# Patient Record
Sex: MALE
[De-identification: names, ages, dates, MRNs, and addresses within clinical notes are randomized per-mention and may not be internally consistent; named-entity substitution may affect disease eponyms.]

---

## 2017-05-17 ENCOUNTER — NON-APPOINTMENT (OUTPATIENT)
Age: 37
End: 2017-05-17

## 2017-05-17 ENCOUNTER — APPOINTMENT (OUTPATIENT)
Dept: INTERNAL MEDICINE | Facility: CLINIC | Age: 37
End: 2017-05-17

## 2017-05-17 VITALS — WEIGHT: 236 LBS | BODY MASS INDEX: 33.04 KG/M2 | HEIGHT: 71 IN

## 2017-05-17 DIAGNOSIS — Z82.61 FAMILY HISTORY OF ARTHRITIS: ICD-10-CM

## 2017-05-17 DIAGNOSIS — M25.569 PAIN IN UNSPECIFIED KNEE: ICD-10-CM

## 2017-05-17 DIAGNOSIS — M25.521 PAIN IN RIGHT ELBOW: ICD-10-CM

## 2017-05-17 DIAGNOSIS — Z00.00 ENCOUNTER FOR GENERAL ADULT MEDICAL EXAMINATION W/OUT ABNORMAL FINDINGS: ICD-10-CM

## 2017-05-17 DIAGNOSIS — Z82.5 FAMILY HISTORY OF ASTHMA AND OTHER CHRONIC LOWER RESPIRATORY DISEASES: ICD-10-CM

## 2017-05-17 DIAGNOSIS — M77.11 LATERAL EPICONDYLITIS, RIGHT ELBOW: ICD-10-CM

## 2017-05-17 DIAGNOSIS — M22.41 CHONDROMALACIA PATELLAE, RIGHT KNEE: ICD-10-CM

## 2017-05-17 DIAGNOSIS — R73.01 IMPAIRED FASTING GLUCOSE: ICD-10-CM

## 2017-05-17 RX ORDER — MULTIVITAMIN
TABLET ORAL
Refills: 0 | Status: ACTIVE | COMMUNITY
Start: 2017-05-17

## 2017-05-30 ENCOUNTER — APPOINTMENT (OUTPATIENT)
Dept: SURGERY | Facility: CLINIC | Age: 37
End: 2017-05-30

## 2017-05-30 VITALS
TEMPERATURE: 98.2 F | RESPIRATION RATE: 18 BRPM | HEART RATE: 61 BPM | OXYGEN SATURATION: 97 % | DIASTOLIC BLOOD PRESSURE: 81 MMHG | SYSTOLIC BLOOD PRESSURE: 125 MMHG

## 2017-06-09 ENCOUNTER — OUTPATIENT (OUTPATIENT)
Dept: OUTPATIENT SERVICES | Facility: HOSPITAL | Age: 37
LOS: 1 days | End: 2017-06-09

## 2017-06-09 VITALS
HEIGHT: 71.5 IN | RESPIRATION RATE: 16 BRPM | WEIGHT: 235.89 LBS | HEART RATE: 84 BPM | DIASTOLIC BLOOD PRESSURE: 72 MMHG | TEMPERATURE: 97 F | SYSTOLIC BLOOD PRESSURE: 130 MMHG

## 2017-06-09 DIAGNOSIS — D17.9 BENIGN LIPOMATOUS NEOPLASM, UNSPECIFIED: ICD-10-CM

## 2017-06-09 DIAGNOSIS — G47.33 OBSTRUCTIVE SLEEP APNEA (ADULT) (PEDIATRIC): ICD-10-CM

## 2017-06-09 DIAGNOSIS — D17.9 BENIGN LIPOMATOUS NEOPLASM, UNSPECIFIED: Chronic | ICD-10-CM

## 2017-06-09 DIAGNOSIS — D17.30 BENIGN LIPOMATOUS NEOPLASM OF SKIN AND SUBCUTANEOUS TISSUE OF UNSPECIFIED SITES: ICD-10-CM

## 2017-06-09 LAB
HCT VFR BLD CALC: 42.6 % — SIGNIFICANT CHANGE UP (ref 39–50)
HGB BLD-MCNC: 14.1 G/DL — SIGNIFICANT CHANGE UP (ref 13–17)
MCHC RBC-ENTMCNC: 29.8 PG — SIGNIFICANT CHANGE UP (ref 27–34)
MCHC RBC-ENTMCNC: 33.1 % — SIGNIFICANT CHANGE UP (ref 32–36)
MCV RBC AUTO: 90.1 FL — SIGNIFICANT CHANGE UP (ref 80–100)
PLATELET # BLD AUTO: 237 K/UL — SIGNIFICANT CHANGE UP (ref 150–400)
PMV BLD: 9.2 FL — SIGNIFICANT CHANGE UP (ref 7–13)
RBC # BLD: 4.73 M/UL — SIGNIFICANT CHANGE UP (ref 4.2–5.8)
RBC # FLD: 12.6 % — SIGNIFICANT CHANGE UP (ref 10.3–14.5)
WBC # BLD: 6.14 K/UL — SIGNIFICANT CHANGE UP (ref 3.8–10.5)
WBC # FLD AUTO: 6.14 K/UL — SIGNIFICANT CHANGE UP (ref 3.8–10.5)

## 2017-06-09 NOTE — H&P PST ADULT - NSANTHOSAYNRD_GEN_A_CORE
No. MARIANO screening performed.  STOP BANG Legend: 0-2 = LOW Risk; 3-4 = INTERMEDIATE Risk; 5-8 = HIGH Risk

## 2017-06-09 NOTE — H&P PST ADULT - RS GEN PE MLT RESP DETAILS PC
good air movement/no rales/no wheezes/breath sounds equal/respirations non-labored/no rhonchi/clear to auscultation bilaterally

## 2017-06-09 NOTE — H&P PST ADULT - MUSCULOSKELETAL
details… detailed exam ROM intact/no joint erythema/no joint swelling/no calf tenderness/no joint warmth

## 2017-06-09 NOTE — H&P PST ADULT - TEACHING/LEARNING LEARNING PREFERENCES
verbal instruction/video/written material/skill demonstration/pictorial/computer/internet/group instruction/audio/individual instruction

## 2017-06-09 NOTE — H&P PST ADULT - HISTORY OF PRESENT ILLNESS
This is a 36 y.o. male who presented to Dr Ulloa complaining of benign lipomas of skin. Pt now for surgery .

## 2017-06-09 NOTE — H&P PST ADULT - PROBLEM SELECTOR PLAN 1
benign lipomatous neoplasm of skin and subcutaneous tissue of unspecified sites;  excision of lipomas .chest and anterior abdomen and left flank , right and left arm anterior medial thigh

## 2017-06-16 ENCOUNTER — RESULT REVIEW (OUTPATIENT)
Age: 37
End: 2017-06-16

## 2017-06-16 ENCOUNTER — OUTPATIENT (OUTPATIENT)
Dept: OUTPATIENT SERVICES | Facility: HOSPITAL | Age: 37
LOS: 1 days | Discharge: HOME | End: 2017-06-16
Payer: MEDICAID

## 2017-06-16 VITALS
SYSTOLIC BLOOD PRESSURE: 126 MMHG | HEART RATE: 53 BPM | DIASTOLIC BLOOD PRESSURE: 78 MMHG | TEMPERATURE: 98 F | OXYGEN SATURATION: 98 % | WEIGHT: 235.89 LBS | RESPIRATION RATE: 20 BRPM | HEIGHT: 71.5 IN

## 2017-06-16 VITALS
HEART RATE: 75 BPM | TEMPERATURE: 98 F | DIASTOLIC BLOOD PRESSURE: 81 MMHG | RESPIRATION RATE: 15 BRPM | SYSTOLIC BLOOD PRESSURE: 135 MMHG | OXYGEN SATURATION: 95 %

## 2017-06-16 DIAGNOSIS — D17.30 BENIGN LIPOMATOUS NEOPLASM OF SKIN AND SUBCUTANEOUS TISSUE OF UNSPECIFIED SITES: ICD-10-CM

## 2017-06-16 DIAGNOSIS — D17.9 BENIGN LIPOMATOUS NEOPLASM, UNSPECIFIED: Chronic | ICD-10-CM

## 2017-06-16 PROCEDURE — 88304 TISSUE EXAM BY PATHOLOGIST: CPT | Mod: 26

## 2017-06-16 PROCEDURE — 21930 EXC BACK LES SC < 3 CM: CPT

## 2017-06-16 PROCEDURE — 21555 EXC NECK LES SC < 3 CM: CPT

## 2017-06-16 PROCEDURE — 22902 EXC ABD LES SC < 3 CM: CPT

## 2017-06-16 PROCEDURE — 24071 EXC ARM/ELBOW LES SC 3 CM/>: CPT

## 2017-06-16 NOTE — BRIEF OPERATIVE NOTE - POST-OP DX
Benign lipomatous neoplasm of skin and subcutaneous tissue of other and unspecified sites  06/16/2017    Active  Myles Zendejas

## 2017-06-16 NOTE — ASU DISCHARGE PLAN (ADULT/PEDIATRIC). - ITEMS TO FOLLOWUP WITH YOUR PHYSICIAN'S
as above  Take narcotic pain medication with food to avoid nausea and vomiting that can be a side effect of the narcotic pain medication  Avoid constipation which may also be a side effect of the narcotic pain medication; increase fluids and fiber in your diet

## 2017-06-16 NOTE — ASU DISCHARGE PLAN (ADULT/PEDIATRIC). - NOTIFY
Pain not relieved by Medications/Persistent Nausea and Vomiting/Swelling that continues/Fever greater than 101/Bleeding that does not stop

## 2017-06-16 NOTE — ASU DISCHARGE PLAN (ADULT/PEDIATRIC). - MEDICATION SUMMARY - MEDICATIONS TO TAKE
I will START or STAY ON the medications listed below when I get home from the hospital:    oxycodone-acetaminophen 5mg-325mg oral tablet  -- 1 tab(s) by mouth every 8 hours, As Needed -for moderate pain -for severe pain MDD:4  -- Caution federal law prohibits the transfer of this drug to any person other  than the person for whom it was prescribed.  May cause drowsiness.  Alcohol may intensify this effect.  Use care when operating dangerous machinery.  This prescription cannot be refilled.  This product contains acetaminophen.  Do not use  with any other product containing acetaminophen to prevent possible liver damage.  Using more of this medication than prescribed may cause serious breathing problems.    -- Indication: For moderate to severe pain

## 2017-06-16 NOTE — ASU DISCHARGE PLAN (ADULT/PEDIATRIC). - INSTRUCTIONS
PROGRESS SLOWLY, INCREASE FLUIDS Bananas, rice, applesauce, toast diet is good for a light diet at home for today. No fried, spicy or greasy foods. Increase fluids as tolerated  If your doctor prescribed narcotic pain medications after your procedure to help prevent and reduce constipation, increase fluid intake and fiber intake in your diet. You may take OTC Stool Softeners such as Colace to help reduce constipation.    INCREASE FLUIDS

## 2017-06-16 NOTE — ASU DISCHARGE PLAN (ADULT/PEDIATRIC). - FOLLOWUP APPOINTMENT CLINIC/PHYSICIAN
Please follow up with Dr. Ulloa 2 weeks after your operation. You may call [phone] to schedule an appointment.

## 2017-06-16 NOTE — BRIEF OPERATIVE NOTE - OPERATION/FINDINGS
EXCISION OF LIPOMAS, CHEST AND MIDLINE Cho  EXCISION OF LIPOMAS- ANTERIOR ABDOMEN LEFT & RIGHT & LEFT  FLANK, RIGHT AND LEFT ARM ANTERIOR MEDIAL  THIGH LEFT & RIGHT

## 2017-06-16 NOTE — ASU DISCHARGE PLAN (ADULT/PEDIATRIC). - NURSING INSTRUCTIONS
Sponge bath for 2-3 days, then may shower afterwards. Pat incision area dry, do not rub. Steri- strips will fall off on their own.

## 2017-06-17 ENCOUNTER — TRANSCRIPTION ENCOUNTER (OUTPATIENT)
Age: 37
End: 2017-06-17

## 2017-06-22 LAB — SURGICAL PATHOLOGY STUDY: SIGNIFICANT CHANGE UP

## 2017-08-08 ENCOUNTER — APPOINTMENT (OUTPATIENT)
Dept: SURGERY | Facility: CLINIC | Age: 37
End: 2017-08-08

## 2020-06-30 PROBLEM — F41.9 ANXIETY DISORDER, UNSPECIFIED: Chronic | Status: ACTIVE | Noted: 2017-06-09

## 2020-07-06 ENCOUNTER — APPOINTMENT (OUTPATIENT)
Dept: SURGERY | Facility: CLINIC | Age: 40
End: 2020-07-06

## 2021-04-15 ENCOUNTER — OUTPATIENT (OUTPATIENT)
Dept: OUTPATIENT SERVICES | Facility: HOSPITAL | Age: 41
LOS: 1 days | End: 2021-04-15
Payer: COMMERCIAL

## 2021-04-15 ENCOUNTER — APPOINTMENT (OUTPATIENT)
Dept: VASCULAR SURGERY | Facility: CLINIC | Age: 41
End: 2021-04-15
Payer: MEDICAID

## 2021-04-15 ENCOUNTER — RESULT REVIEW (OUTPATIENT)
Age: 41
End: 2021-04-15

## 2021-04-15 DIAGNOSIS — D17.9 BENIGN LIPOMATOUS NEOPLASM, UNSPECIFIED: Chronic | ICD-10-CM

## 2021-04-15 DIAGNOSIS — S60.559A SUPERFICIAL FOREIGN BODY OF UNSPECIFIED HAND, INITIAL ENCOUNTER: ICD-10-CM

## 2021-04-15 LAB
GRAM STN FLD: SIGNIFICANT CHANGE UP
SPECIMEN SOURCE: SIGNIFICANT CHANGE UP

## 2021-04-15 PROCEDURE — 87070 CULTURE OTHR SPECIMN AEROBIC: CPT

## 2021-04-15 PROCEDURE — 88304 TISSUE EXAM BY PATHOLOGIST: CPT | Mod: 26

## 2021-04-15 PROCEDURE — 99072 ADDL SUPL MATRL&STAF TM PHE: CPT

## 2021-04-15 PROCEDURE — 87075 CULTR BACTERIA EXCEPT BLOOD: CPT

## 2021-04-15 PROCEDURE — 35860 EXPLORE LIMB VESSELS: CPT

## 2021-04-15 PROCEDURE — 99203 OFFICE O/P NEW LOW 30 MIN: CPT | Mod: 57

## 2021-04-15 RX ORDER — CEPHALEXIN 250 MG/1
250 TABLET ORAL EVERY 8 HOURS
Qty: 21 | Refills: 0 | Status: ACTIVE | COMMUNITY
Start: 2021-04-15 | End: 1900-01-01

## 2021-04-15 NOTE — PROCEDURE
[FreeTextEntry1] : I had a discussion with patient about the options of continued surveillance vs. exploration. The patient is reluctant to undergo observation and we agreed given the clinical findings, and the concern on outpatient imaging, to explore the dorsum of the hand. We discussed doing this in the hospital vs. office, and given the concerns for Covid testing and delays in the procedure, the patient opted to undergo treatment in the office.\par \par Informed consent was obtained. Risks included pain, bleeding, infection, nerve/ muscle/ tendon injury.\par \par The dorsum of the hand was prepped with betadine.\par \par We infiltrated approximately 3cc's of 1% lidocain (no epi).\par \par A linear incision was made of the area of prominence.\par I dissected through the subcutanoues tissue using a #15 blade scalpel.\par I identified the vein that appeared to be causing the prominenence.\par A venotomy was made, and inside the vein was a 2 inch cord of thrombotic material. On inspection inside the vein there was no foreign body.\par I closed the venotomy with a 5-0 prolene suture.\par I then explored the superficial area of the dorsum of the hand using gentle blunt dissection and could not identify any foreign body.\par The incision was irrigated.\par It was then closed with four interrupted 3-0 nylon sutures\par Sterile dressings were applied.

## 2021-04-15 NOTE — ASSESSMENT
[FreeTextEntry1] : 41 y/o M with episode of phlebitis following IV. An exploration was performed given concern for foreign body based on outside imaging, and clinical picture of painful cord-like swelling.\par \par Plan:\par 1) Patient given wound care advice.\par 2) Will see in 1 week to remove sutures.\par 3) Advised to call if pain or swelling, erythema develops.\par 4) Given 1 week of Keflex.

## 2021-04-15 NOTE — PHYSICAL EXAM
[2+] : left 2+ [Alert] : alert [Oriented to Person] : oriented to person [Oriented to Place] : oriented to place [Oriented to Time] : oriented to time [Calm] : calm [de-identified] : Ambulatory [de-identified] : Dorsum of right hand, there is a palpable cord, mildly tender, in one of the dorsal hand veins. This begins at the puncture site of the IV, and tracks up the back of the hand. It is non-compressible and mobile. No erythema or pus. No discharge.

## 2021-04-20 ENCOUNTER — OUTPATIENT (OUTPATIENT)
Dept: OUTPATIENT SERVICES | Facility: HOSPITAL | Age: 41
LOS: 1 days | End: 2021-04-20
Payer: COMMERCIAL

## 2021-04-20 DIAGNOSIS — S60.559A SUPERFICIAL FOREIGN BODY OF UNSPECIFIED HAND, INITIAL ENCOUNTER: ICD-10-CM

## 2021-04-20 DIAGNOSIS — D17.9 BENIGN LIPOMATOUS NEOPLASM, UNSPECIFIED: Chronic | ICD-10-CM

## 2021-04-20 LAB
CULTURE RESULTS: NO GROWTH — SIGNIFICANT CHANGE UP
SPECIMEN SOURCE: SIGNIFICANT CHANGE UP

## 2021-04-21 LAB — SURGICAL PATHOLOGY STUDY: SIGNIFICANT CHANGE UP

## 2021-04-22 ENCOUNTER — NON-APPOINTMENT (OUTPATIENT)
Age: 41
End: 2021-04-22

## 2021-04-22 ENCOUNTER — APPOINTMENT (OUTPATIENT)
Dept: VASCULAR SURGERY | Facility: CLINIC | Age: 41
End: 2021-04-22

## 2021-04-27 PROCEDURE — 88304 TISSUE EXAM BY PATHOLOGIST: CPT

## 2021-04-27 PROCEDURE — 97530 THERAPEUTIC ACTIVITIES: CPT

## 2023-09-11 NOTE — HISTORY OF PRESENT ILLNESS
No [FreeTextEntry1] : 41 y/o M presents to our office with complaints of "an IV stuck in his right hand'. The patient underwent a vasectomy approximately 2 weeks ago. SInce that time, has had pain on the dorsum of his right hand where the IV was placed. Initially the hand felt okay, however after a few days it became swollen and more painful when moving it. He is now left with the feeling of something being stuck in his hand. He has been to his ER twice and had US and and X-Ray. They raised the concern of a foreign body (IV) being in the vein. He did not want to continue treatment there and came to see me, as we have previously treated his family. Also did not want to wait in the ER.\par \par Now the hand is no longer swollen, however, he has a palpable cord for approximately 1-2 inches on the back of his hand. Denies fevers or chills. Has recovered from the vasectomy and is otherwise doing well.\par \par He is due to move to [place] in 2 weeks with his family. They are relocating for work and lifestyle reasons.
